# Patient Record
Sex: MALE | Race: WHITE | NOT HISPANIC OR LATINO
[De-identification: names, ages, dates, MRNs, and addresses within clinical notes are randomized per-mention and may not be internally consistent; named-entity substitution may affect disease eponyms.]

---
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
## 2023-01-04 ENCOUNTER — APPOINTMENT (OUTPATIENT)
Dept: PODIATRY | Age: 18
End: 2023-01-04

## 2023-02-10 ENCOUNTER — OFFICE VISIT (OUTPATIENT)
Dept: PODIATRY | Age: 18
End: 2023-02-10

## 2023-02-10 DIAGNOSIS — L60.0 INGROWN NAIL: Primary | ICD-10-CM

## 2023-02-10 PROCEDURE — 11750 EXCISION NAIL&NAIL MATRIX: CPT | Performed by: PODIATRIST

## 2023-02-10 PROCEDURE — 99213 OFFICE O/P EST LOW 20 MIN: CPT | Performed by: PODIATRIST

## 2023-02-24 ENCOUNTER — OFFICE VISIT (OUTPATIENT)
Dept: PODIATRY | Age: 18
End: 2023-02-24

## 2023-02-24 DIAGNOSIS — B35.1 FUNGAL NAIL INFECTION: ICD-10-CM

## 2023-02-24 DIAGNOSIS — L60.0 INGROWN NAIL: ICD-10-CM

## 2023-02-24 DIAGNOSIS — T25.131A BURN ERYTHEMA OF TOE, RIGHT, INITIAL ENCOUNTER: Primary | ICD-10-CM

## 2023-02-24 DIAGNOSIS — L60.3 NAIL DYSTROPHY: ICD-10-CM

## 2023-02-24 PROCEDURE — 16020 DRESS/DEBRID P-THICK BURN S: CPT | Performed by: PODIATRIST

## (undated) NOTE — LETTER
Date & Time: 8/23/2020, 10:40 AM  Patient: Chestine Prim  Encounter Provider(s):    Lillian Green PA-C       To Whom It May Concern:    Dick Palemeterio was seen and treated in our department on 8/23/2020.   COVID-19 precautions; must quarantine unt